# Patient Record
(demographics unavailable — no encounter records)

---

## 2024-10-29 NOTE — REVIEW OF SYSTEMS
[Anxiety] : anxiety [As Noted in HPI] : as noted in HPI [Arthralgias] : arthralgias [Negative] : Heme/Lymph

## 2024-10-29 NOTE — HISTORY OF PRESENT ILLNESS
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  However an ovoid lesion was noted in the left proximal posterior thigh but was only partially imaged with a suspicion of schwannoma along the sciatic nerve and it apparently measured 2.5 cm x 1.3 cm and consequently referred to a neurologist and she found my name accepting her insurance and therefore made an appointment.  Current complaints consists of pain in the area of the right hip to the knee anterior and posteriorly if she takes a long car ride but after resting it disappears and is ongoing for 2 to 3 years and denied any symptoms in the left leg whatsoever.  She specifically denied any pain in the left paralumbar or gluteal area there is no tingling numbness weakness claudication in the left leg and because of the incidental findings she was referred to my office.  Review of system was unremarkable and past medical history is unremarkable except for mild hypercholesterolemia and had no surgical procedures.  Family history reveals that father had coronary artery disease and mother has high cholesterol hypertension and is a prediabetic and she is the only child and has no other siblings.  She is  with 3 healthy children and are well and there is no known family history of Von Recklinghausen's disease or neurofibromatosis or schwannomas.  I reviewed her medications and allergies.

## 2024-10-29 NOTE — PHYSICAL EXAM
[General Appearance - Alert] : alert [General Appearance - In No Acute Distress] : in no acute distress [Oriented To Time, Place, And Person] : oriented to person, place, and time [Impaired Insight] : insight and judgment were intact [Affect] : the affect was normal [Person] : oriented to person [Place] : oriented to place [Time] : oriented to time [Concentration Intact] : normal concentrating ability [Visual Intact] : visual attention was ~T not ~L decreased [Naming Objects] : no difficulty naming common objects [Repeating Phrases] : no difficulty repeating a phrase [Writing A Sentence] : no difficulty writing a sentence [Fluency] : fluency intact [Comprehension] : comprehension intact [Reading] : reading intact [Past History] : adequate knowledge of personal past history [Cranial Nerves Optic (II)] : visual acuity intact bilaterally,  visual fields full to confrontation, pupils equal round and reactive to light [Cranial Nerves Oculomotor (III)] : extraocular motion intact [Cranial Nerves Trigeminal (V)] : facial sensation intact symmetrically [Cranial Nerves Vestibulocochlear (VIII)] : hearing was intact bilaterally [Cranial Nerves Facial (VII)] : face symmetrical [Cranial Nerves Glossopharyngeal (IX)] : tongue and palate midline [Cranial Nerves Accessory (XI - Cranial And Spinal)] : head turning and shoulder shrug symmetric [Cranial Nerves Hypoglossal (XII)] : there was no tongue deviation with protrusion [Motor Tone] : muscle tone was normal in all four extremities [Motor Strength] : muscle strength was normal in all four extremities [No Muscle Atrophy] : normal bulk in all four extremities [Sensation Tactile Decrease] : light touch was intact [Abnormal Walk] : normal gait [Balance] : balance was intact [2+] : Ankle jerk left 2+ [FreeTextEntry1] : Vital signs were recorded and unremarkable.  Head neck, ear nose and throat is unremarkable.  Neck is supple with full range of motion there are no abnormal skin lesions or cafe au lait spots but a small telangiectatic skin lesion was noted in the right lower quadrant but is not consistent with cafe au lait spots and her entire skin was inspected carefully in the presence of my fellow and we did not find any abnormal skin lesions.  Peripheral nerves are preserved without any hypertrophy and sciatic notch is not tender and the course of the sciatic nerve from the gluteal fold down to her popliteal fossa is unremarkable without any Tinel's sign.  Pedal pulsations are normal there is no leg edema and no muscle atrophy.  Straight leg raising test is negative lumbar spine range of motion is normal and her entire neurological evaluation is completely normal. [Past-pointing] : there was no past-pointing [Tremor] : no tremor present [Plantar Reflex Right Only] : normal on the right [Plantar Reflex Left Only] : normal on the left

## 2024-10-29 NOTE — DISCUSSION/SUMMARY
[FreeTextEntry1] : Opinion-Ms. Bingham has an incidental finding of a small ovoid lesion near the sciatic nerve in the upper thigh but I do not have the actual films and the study was done without contrast.  I advised her to secure her disc and the official report to be forwarded to my office immediately and I will plan to perform further investigations if necessary including electrophysiologic studies and she expressed understanding.  I had a detailed conversation with pictorial depiction of the area involved and since she is totally asymptomatic it appears that she needs to be investigated further and serial studies if necessary.  I had extensive education and counseling session in the presence of my fellow Dr. Castro and she expressed understanding and will keep in touch with me and will email me the reports of her orthopedic consultation including the MRI study report that was dictated by her and her cell phone.  She expressed understanding and will follow my advice.

## 2024-10-29 NOTE — DATA REVIEWED
[de-identified] : I reviewed the MRI report from her cell phone which was undertaken at [facility] revealing a small ovoid lesion measuring 2.5 cm x 1.5 cm in the proximal posterior thigh but was only partially imaged and is suspicious for schwannoma solitary near the sciatic nerve.  I have some gynecological reports which are noncontributory.